# Patient Record
Sex: MALE | Race: WHITE | NOT HISPANIC OR LATINO | Employment: OTHER | URBAN - METROPOLITAN AREA
[De-identification: names, ages, dates, MRNs, and addresses within clinical notes are randomized per-mention and may not be internally consistent; named-entity substitution may affect disease eponyms.]

---

## 2017-10-23 ENCOUNTER — HOSPITAL ENCOUNTER (EMERGENCY)
Facility: OTHER | Age: 77
Discharge: HOME | End: 2017-10-23
Attending: EMERGENCY MEDICINE
Payer: MEDICARE

## 2017-10-23 VITALS
RESPIRATION RATE: 16 BRPM | TEMPERATURE: 98 F | HEIGHT: 69 IN | DIASTOLIC BLOOD PRESSURE: 63 MMHG | SYSTOLIC BLOOD PRESSURE: 111 MMHG | HEART RATE: 61 BPM | BODY MASS INDEX: 37.77 KG/M2 | WEIGHT: 255 LBS | OXYGEN SATURATION: 96 %

## 2017-10-23 DIAGNOSIS — S20.211A CONTUSION OF RIB ON RIGHT SIDE, INITIAL ENCOUNTER: Primary | ICD-10-CM

## 2017-10-23 DIAGNOSIS — I48.20 CHRONIC A-FIB: ICD-10-CM

## 2017-10-23 LAB
BILIRUB UR QL STRIP: NEGATIVE
CLARITY UR: CLEAR
COLOR UR: YELLOW
GLUCOSE UR QL STRIP: NEGATIVE
HGB UR QL STRIP: NEGATIVE
KETONES UR QL STRIP: NEGATIVE
LEUKOCYTE ESTERASE UR QL STRIP: NEGATIVE
NITRITE UR QL STRIP: NEGATIVE
PH UR STRIP: 6 [PH] (ref 5–8)
PROT UR QL STRIP: NEGATIVE
SP GR UR STRIP: 1.02 (ref 1–1.03)
URN SPEC COLLECT METH UR: NORMAL
UROBILINOGEN UR STRIP-ACNC: NEGATIVE EU/DL

## 2017-10-23 PROCEDURE — 93005 ELECTROCARDIOGRAM TRACING: CPT

## 2017-10-23 PROCEDURE — 93010 ELECTROCARDIOGRAM REPORT: CPT | Mod: ,,, | Performed by: INTERNAL MEDICINE

## 2017-10-23 PROCEDURE — 99284 EMERGENCY DEPT VISIT MOD MDM: CPT | Mod: 25

## 2017-10-23 PROCEDURE — 81003 URINALYSIS AUTO W/O SCOPE: CPT

## 2017-10-23 PROCEDURE — 25000003 PHARM REV CODE 250: Performed by: EMERGENCY MEDICINE

## 2017-10-23 RX ORDER — WARFARIN 1 MG/1
1.25 TABLET ORAL DAILY
COMMUNITY

## 2017-10-23 RX ORDER — FUROSEMIDE 40 MG/1
40 TABLET ORAL 2 TIMES DAILY
COMMUNITY

## 2017-10-23 RX ORDER — ACETAMINOPHEN AND CODEINE PHOSPHATE 300; 30 MG/1; MG/1
1 TABLET ORAL
Status: COMPLETED | OUTPATIENT
Start: 2017-10-23 | End: 2017-10-23

## 2017-10-23 RX ORDER — ERGOCALCIFEROL 1.25 MG/1
50000 CAPSULE ORAL
COMMUNITY

## 2017-10-23 RX ORDER — AMIODARONE HYDROCHLORIDE 200 MG/1
TABLET ORAL DAILY
COMMUNITY

## 2017-10-23 RX ORDER — FENOFIBRATE 160 MG/1
160 TABLET ORAL DAILY
COMMUNITY

## 2017-10-23 RX ORDER — LISINOPRIL 2.5 MG/1
2.5 TABLET ORAL DAILY
COMMUNITY

## 2017-10-23 RX ORDER — INSULIN GLARGINE 300 [IU]/ML
INJECTION, SOLUTION SUBCUTANEOUS
COMMUNITY

## 2017-10-23 RX ORDER — ROSUVASTATIN CALCIUM 10 MG/1
10 TABLET, COATED ORAL DAILY
COMMUNITY

## 2017-10-23 RX ORDER — INSULIN LISPRO 100 [IU]/ML
INJECTION, SOLUTION INTRAVENOUS; SUBCUTANEOUS
COMMUNITY

## 2017-10-23 RX ORDER — HYDROCODONE BITARTRATE AND ACETAMINOPHEN 5; 325 MG/1; MG/1
1-2 TABLET ORAL EVERY 4 HOURS PRN
Qty: 30 TABLET | Refills: 0 | Status: SHIPPED | OUTPATIENT
Start: 2017-10-23

## 2017-10-23 RX ADMIN — ACETAMINOPHEN AND CODEINE PHOSPHATE 1 TABLET: 300; 30 TABLET ORAL at 01:10
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
## 2017-10-23 NOTE — ED NOTES
Rounding completed on pt. PT reporting pain somewhat improved but increases with inspiration or movement. Pt sitting on edge of stretcher with family at beside per request. Pt tolerating well. Denies any other complaints. Bed in lowest, locked position. Call light within reach.

## 2017-10-23 NOTE — ED TRIAGE NOTES
Pt reports to ED c/o right rib pain worsening with inspiration after falling backwards off walker last night. Pt unsure of trauma to head, denies LOC, admits bracing fall with right elbow and right side of body. Pt currently on warfarin, denies any headaches since fall. Denies chest pain, SOB, N/V/D, dysuria/hematuria, increased swelling to lower extremities. Pt compliant with all medications, currently in town from [place] for vacation. Admits having 1 ETOH drink last night.